# Patient Record
Sex: FEMALE | Race: BLACK OR AFRICAN AMERICAN | NOT HISPANIC OR LATINO | Employment: FULL TIME | URBAN - METROPOLITAN AREA
[De-identification: names, ages, dates, MRNs, and addresses within clinical notes are randomized per-mention and may not be internally consistent; named-entity substitution may affect disease eponyms.]

---

## 2017-07-30 ENCOUNTER — HOSPITAL ENCOUNTER (EMERGENCY)
Facility: HOSPITAL | Age: 52
Discharge: HOME | End: 2017-07-30
Attending: EMERGENCY MEDICINE
Payer: COMMERCIAL

## 2017-07-30 VITALS
TEMPERATURE: 98 F | RESPIRATION RATE: 18 BRPM | HEIGHT: 59 IN | BODY MASS INDEX: 38.1 KG/M2 | WEIGHT: 189 LBS | SYSTOLIC BLOOD PRESSURE: 143 MMHG | HEART RATE: 106 BPM | OXYGEN SATURATION: 99 % | DIASTOLIC BLOOD PRESSURE: 83 MMHG
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
## 2017-07-30 DIAGNOSIS — G44.209 TENSION HEADACHE: ICD-10-CM

## 2017-07-30 DIAGNOSIS — Z04.1 ENCOUNTER FOR EXAMINATION FOLLOWING MOTOR VEHICLE ACCIDENT (MVA): ICD-10-CM

## 2017-07-30 PROCEDURE — 99283 EMERGENCY DEPT VISIT LOW MDM: CPT

## 2017-07-30 RX ORDER — CYCLOBENZAPRINE HCL 10 MG
5 TABLET ORAL 3 TIMES DAILY PRN
Qty: 15 TABLET | Refills: 0 | Status: SHIPPED | OUTPATIENT
Start: 2017-07-30 | End: 2017-08-04

## 2017-07-30 RX ORDER — ETODOLAC 400 MG/1
400 TABLET, FILM COATED ORAL EVERY 12 HOURS PRN
Qty: 20 TABLET | Refills: 0 | Status: SHIPPED | OUTPATIENT
Start: 2017-07-30 | End: 2018-08-07
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
## 2018-08-07 PROBLEM — Z12.11 ENCOUNTER FOR SCREENING COLONOSCOPY: Status: ACTIVE | Noted: 2018-08-07

## 2018-08-07 PROBLEM — E11.42 TYPE 2 DIABETES MELLITUS WITH DIABETIC POLYNEUROPATHY, WITH LONG-TERM CURRENT USE OF INSULIN: Status: ACTIVE | Noted: 2018-08-07

## 2018-08-07 PROBLEM — E11.3393 MODERATE NONPROLIFERATIVE DIABETIC RETINOPATHY OF BOTH EYES WITHOUT MACULAR EDEMA ASSOCIATED WITH TYPE 2 DIABETES MELLITUS: Status: ACTIVE | Noted: 2018-08-07

## 2018-08-07 PROBLEM — Z79.4 TYPE 2 DIABETES MELLITUS WITH DIABETIC POLYNEUROPATHY, WITH LONG-TERM CURRENT USE OF INSULIN: Status: ACTIVE | Noted: 2018-08-07

## 2018-08-07 PROBLEM — Z00.00 ROUTINE GENERAL MEDICAL EXAMINATION AT A HEALTH CARE FACILITY: Status: ACTIVE | Noted: 2018-08-07

## 2018-08-07 PROBLEM — Z11.3 SCREENING EXAMINATION FOR VENEREAL DISEASE: Status: ACTIVE | Noted: 2018-08-07

## 2018-08-07 PROBLEM — E11.42 DIABETIC POLYNEUROPATHY ASSOCIATED WITH TYPE 2 DIABETES MELLITUS: Status: ACTIVE | Noted: 2018-08-07

## 2018-08-07 PROBLEM — E55.9 VITAMIN D DEFICIENCY: Status: ACTIVE | Noted: 2018-08-07

## 2018-08-22 PROBLEM — R80.9 PROTEINURIA: Status: ACTIVE | Noted: 2018-08-22

## 2018-09-06 ENCOUNTER — CLINICAL SUPPORT (OUTPATIENT)
Dept: DIABETES | Facility: CLINIC | Age: 53
End: 2018-09-06
Payer: COMMERCIAL

## 2018-09-06 VITALS — WEIGHT: 197.63 LBS | BODY MASS INDEX: 41.48 KG/M2 | HEIGHT: 58 IN

## 2018-09-06 DIAGNOSIS — Z79.4 TYPE 2 DIABETES MELLITUS WITH DIABETIC POLYNEUROPATHY, WITH LONG-TERM CURRENT USE OF INSULIN: Primary | ICD-10-CM

## 2018-09-06 DIAGNOSIS — E11.42 TYPE 2 DIABETES MELLITUS WITH DIABETIC POLYNEUROPATHY, WITH LONG-TERM CURRENT USE OF INSULIN: Primary | ICD-10-CM

## 2018-09-06 PROCEDURE — 99999 PR PBB SHADOW E&M-EST. PATIENT-LVL III: CPT | Mod: PBBFAC,,, | Performed by: DIETITIAN, REGISTERED

## 2018-09-06 PROCEDURE — G0108 DIAB MANAGE TRN  PER INDIV: HCPCS | Mod: S$GLB,,, | Performed by: DIETITIAN, REGISTERED

## 2018-09-06 NOTE — PROGRESS NOTES
Diabetes Education  Author: Anusha Broussard RD  Date: 9/6/2018    Diabetes Education Visit  Diabetes Education Record Assessment/Progress: Initial    Diabetes Type  Diabetes Type : Type II    Diabetes History  Diabetes Diagnosis: >10 years(dx 2001)    Referring Provider: Voila Garcia MD  52 y.o. female in clinic today for diabetes education. Patient has taken diabetes education courses in the past. Pt said she did well with diet when first diagnosed and has done less as time went on.   Dr. Garcia has prescribed Freestyle Delaney, but no approval by insurance yet.     DM MEDICATIONS:    Humalog 75-25, 60 units am, 70 units pm  Bydureon, 2mg weekly - has not been approved by insurance yet so not yet started       No results found for: HGBA1C    Nutrition  Meal Planning: skipping meals(usually eats twice daily)  What type of sweetener do you use?: sugar  What type of beverages do you drink?: regular soda/tea, water, juice  Meal Plan 24 Hour Recall - Breakfast: coffee w/sugar and creamer  Meal Plan 24 Hour Recall - Lunch: 12p - fried chicken leg and thigh, french fries, Sprite  Meal Plan 24 Hour Recall - Dinner: 8p - rice, navy beans, pinapple Crush   Meal Plan 24 Hour Recall - Snack: none    Monitoring   Self Monitoring : last checked 1 wk ago - fasting was in 400's   Blood Glucose Logs: No  Do you use a personal glucose monitor?: No  In the last month, how often have you had a low blood sugar reaction?: never  Can you tell when your blood sugar is too high?: yes(dry mouth, sluggish, frequent urination)  How do you treat high blood sugar?: drink water, take medicine    Exercise   Exercise Type: none(having neuropathy in legs and feet which prevents exercise)    Current Diabetes Treatment   Current Treatment: Diet, Insulin    Social History  Preferred Learning Method: Face to Face  Primary Support: Self  Educational Level: Some College(associate degree in accounting)  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  Smoking  Status: Never a Smoker  Alcohol Use: Rarely      DDS-2 Score  ( > 3 = SIGNIFICANT DISTRESS): 3.5  DDS Score  ( > 3 = SIGNIFICANT DISTRESS): 2.59  Emotional [place] Score: 3  Physician-Related Distress: 1  Regimen-Related Distress: 4  Interpersonal Distress: 1.67    Barriers to Change  Learning Challenges : Vision  Vision - further explanation: visually impaired(diabetic retinopathy, 2011 - lost sight in left eye, 2012 retina reattached and got vision back )    Readiness to Learn   Readiness to Learn : Acceptance    Cultural Influences  Cultural Influences: No    Diabetes Education Assessment/Progress  Diabetes Disease Process (diabetes disease process and treatment options): Discussion, Individual Session, Demonstrates Understanding/Competency(verbalizes/demonstrates)  Nutrition (Incorporating nutritional management into one's lifestyle): Discussion, Individual Session, Demonstrates Understanding/Competency (verbalizes/demonstrates), Written Materials Provided, Instructed  Physical Activity (incorporating physical activity into one's lifestyle): Discussion, Individual Session, Demonstrates Understanding/Competency (verbalizes/demonstrates)  Medications (states correct name, dose, onset, peak, duration, side effects & timing of meds): Discussion, Individual Session, Demonstrates Understanding/Competency(verbalizes/demonstrates)  Monitoring (monitoring blood glucose/other parameters & using results): Discussion, Individual Session, Instructed, Demonstrates Understanding/Competency (verbalizes/demonstrates), Written Materials Provided  Acute Complications (preventing, detecting, and treating acute complications): Discussion, Instructed, Individual Session, Demonstrates Understanding/Competency (verbalizes/demonstrates), Written Materials Provided  Chronic Complications (preventing, detecting, and treating chronic complications): Discussion, Individual Session, Demonstrates Understanding/Competency  (verbalizes/demonstrates)  Clinical (diabetes, other pertinent medical history, and relevant comorbidities reviewed during visit): Discussion, Individual Session, Demonstrates Understanding/Competency (verbalizes/demonstrates)  Cognitive (knowledge of self-management skills, functional health literacy): Discussion, Individual Session, Demonstrates Understanding/Competency (verbalizes/demonstrates)  Psychosocial (emotional response to diabetes): Discussion, Individual Session, Demonstrates Understanding/Competency (verbalizes/demonstrates)  Diabetes Distress and Support Systems: Other, Individual Session  Behavioral (readiness for change, lifestyle practices, self-care behaviors): Discussion, Individual Session, Demonstrates Understanding/Competency (verbalizes/demonstrates)    Goals  Patient has selected/evaluated goals during today's session: Yes, selected  Healthy Eating: Set(Avoid sugary beverages; use Splenda in coffee, drink Diet soft drinks; follow meal plan)  Start Date: 09/06/18  Target Date: 12/06/18  Physical Activity: Set(start using stationary bike at home, 3 days per wk )  Start Date: 09/06/18  Target Date: 12/06/18  Monitoring: Set(check bg 2-3 times daily - at least fasting and ac dinner )  Start Date: 09/06/18  Target Date: 12/06/18       Diabetes Care Plan/Intervention  Education Plan/Intervention: Individual Follow-Up DSMT, Other(Requested labs from Dr. Garcia - pt stated that recent a1c was done)   F/u in 6 wks    Diabetes Meal Plan  Calories: 1400, 1200  Carbohydrate Per Meal: 30-45g  Carbohydrate Per Snack : 15-20g    Education Units of Time   Time Spent: 60 min    Health Maintenance was reviewed today with patient. Discussed with patient importance of routine eye exams, foot exams/foot care, blood work (i.e.: A1c, microalbumin, and lipid), dental visits, yearly flu vaccine, and pneumonia vaccine as indicated by PCP. Patient verbalized understanding.     Health Maintenance Topics with due  status: Not Due       Topic Last Completion Date    Eye Exam 12/01/2017    TETANUS VACCINE 08/07/2018    Foot Exam 08/07/2018    Lipid Panel 08/07/2018    Hemoglobin A1c 08/07/2018    Low Dose Statin 09/06/2018     Health Maintenance Due   Topic Date Due    Pneumococcal PPSV23 (Medium Risk) (1) 10/23/1985    Pap Smear with HPV Cotest  10/23/1988    Mammogram  10/23/2007    Colonoscopy  10/23/2017

## 2018-09-06 NOTE — LETTER
September 6, 2018        Viola Garcia MD  [address]  [facility] [address]             [facility]  [address] [place] [address]  Phone: [phone]  Fax: [phone]   Patient: Jeanne Johnson   MR Number: [id number]   YOB: 1965   Date of Visit: 9/6/2018       Dear Dr. Garcia:    Thank you for referring Jeanne Johnson to me for evaluation. Below are the relevant portions of my assessment and plan of care.      If you have questions, please do not hesitate to call me. I look forward to following Jeanne along with you.    Sincerely,      Anusha Broussard RD           CC  No Recipients

## 2018-09-06 NOTE — PATIENT INSTRUCTIONS
- Avoid beverages with added sugar. Use Splenda in coffee. Drink diet sodas.      - Use stationary bike or treadmill 3 days/week    - Check blood sugar at least twice daily    Fasting blood sugar should be <130  After meal blood sugar should be <180

## 2018-09-10 ENCOUNTER — TELEPHONE (OUTPATIENT)
Dept: PODIATRY | Facility: CLINIC | Age: 53
End: 2018-09-10

## 2018-09-10 NOTE — TELEPHONE ENCOUNTER
Ms. DROIAN Johnson was given a return call, she rescheduled with Dr. Jenkins on 9/26/2018 for 9:40am. Patient verbalized understanding and the call ended well.     FYI: Patient had an appointment 9/6/2018 but due to her referral not being approved she was unable to be seen. She wanted to know if she would be charged for missing her appointment and how does she know if her referral has been approved. She was informed to call the clinic after her and I call ends and ask these questions and she receive an answer or be transferred to the appropriate department to have her questions answered.  Patient verbalized understanding and the call ended well.

## 2018-09-10 NOTE — TELEPHONE ENCOUNTER
----- Message from Miley Rae LPN sent at 9/7/2018  2:54 PM CDT -----  Contact: self      ----- Message -----  From: Chuck Syed  Sent: 9/7/2018   2:12 PM  To: Gregory Marlow Staff    Pt called about the status of her yesterday appt she receive text saying appt was cancel pt stated she was the just couldn't be seen by Doctor.

## 2018-09-26 ENCOUNTER — OFFICE VISIT (OUTPATIENT)
Dept: PODIATRY | Facility: CLINIC | Age: 53
End: 2018-09-26
Payer: COMMERCIAL

## 2018-09-26 VITALS
SYSTOLIC BLOOD PRESSURE: 120 MMHG | HEIGHT: 58 IN | RESPIRATION RATE: 20 BRPM | DIASTOLIC BLOOD PRESSURE: 74 MMHG | WEIGHT: 197.75 LBS | HEART RATE: 95 BPM | BODY MASS INDEX: 41.51 KG/M2

## 2018-09-26 DIAGNOSIS — M21.41 PES PLANUS OF BOTH FEET: ICD-10-CM

## 2018-09-26 DIAGNOSIS — Z79.4 TYPE 2 DIABETES MELLITUS WITH DIABETIC POLYNEUROPATHY, WITH LONG-TERM CURRENT USE OF INSULIN: Primary | ICD-10-CM

## 2018-09-26 DIAGNOSIS — M21.42 PES PLANUS OF BOTH FEET: ICD-10-CM

## 2018-09-26 DIAGNOSIS — E11.42 TYPE 2 DIABETES MELLITUS WITH DIABETIC POLYNEUROPATHY, WITH LONG-TERM CURRENT USE OF INSULIN: Primary | ICD-10-CM

## 2018-09-26 PROCEDURE — 99999 PR PBB SHADOW E&M-EST. PATIENT-LVL III: CPT | Mod: PBBFAC,,, | Performed by: PODIATRIST

## 2018-09-26 PROCEDURE — 99204 OFFICE O/P NEW MOD 45 MIN: CPT | Mod: S$GLB,,, | Performed by: PODIATRIST

## 2018-09-26 PROCEDURE — 3008F BODY MASS INDEX DOCD: CPT | Mod: CPTII,S$GLB,, | Performed by: PODIATRIST

## 2018-09-26 NOTE — PATIENT INSTRUCTIONS
[facility]    [address] [place] [address]  Phone: [phone] [address] [place] [address]   Phone: [phone] [address] [place] [address]   Phone: [phone] [address] [facility] [place] [address]   Phone: [phone]    [address] [place] [address]   Phone: [phone] [address] [facility] [address] [place] [address], [place] [address]   Phone: [phone]

## 2018-09-26 NOTE — LETTER
September 28, 2018      Viola Garcia MD  [address]  [place] [address]           [facility]  [address]  [place] [address]  Phone: [phone]  Fax: [phone]          Patient: Jeanne Johnson   MR Number: [id number]   YOB: 1965   Date of Visit: 9/26/2018       Dear Dr. Viola Garcia:    Thank you for referring Jeanne Johnson to me for evaluation. Attached you will find relevant portions of my assessment and plan of care.    If you have questions, please do not hesitate to call me. I look forward to following Jeanne Johnson along with you.    Sincerely,    Kashmir Jenkins DPM    Enclosure  CC:  No Recipients    If you would like to receive this communication electronically, please contact [email or web address] or [phone] to request more information on [employer] access.    For providers and/or their staff who would like to refer a patient to [facility], please contact us through our one-stop-shop provider referral line, [facility], at [phone].    If you feel you have received this communication in error or would no longer like to receive these types of communications, please e-mail [email or web address]

## 2018-09-29 NOTE — PROGRESS NOTES
Subjective:     Patient ID: Jeanne Johnson is a 52 y.o. female.    Chief Complaint: Diabetic Foot Exam (last pcp visit was on 8/22/2018 with Dr. Garcia)    Jeanne is a 52 y.o. female who presents to the clinic upon referral from Dr. Garcia  for evaluation and treatment of diabetic feet. Jeanne has a past medical history of Atrial fibrillation, Cataract, CHF (congestive heart failure), Diabetes mellitus, Diabetes mellitus, type 2, Diabetic retinopathy, High cholesterol, Hypertension, Hyperthyroidism, SVT (supraventricular tachycardia), Tachycardia, and Tachycardia. Patient relates no major problem with feet. Only complaints today consist of arch pain. Rates pain 2/10 when present.     PCP: Viola Garcia MD    Date Last Seen by PCP: 8/22/18    Current shoe gear: Casual shoes      Patient Active Problem List   Diagnosis    Cataract associated with type 2 diabetes mellitus    HLD (hyperlipidemia)    Routine general medical examination at a health care facility    Vitamin D deficiency    Moderate nonproliferative diabetic retinopathy of both eyes without macular edema associated with type 2 diabetes mellitus    Diabetic polyneuropathy associated with type 2 diabetes mellitus    Type 2 diabetes mellitus with diabetic polyneuropathy, with long-term current use of insulin    Screening examination for venereal disease    Encounter for screening colonoscopy    Proteinuria          Medication List           Accurate as of 9/26/18 11:59 PM. If you have any questions, ask your nurse or doctor.               CHANGE how you take these medications    AFLURIA QUAD 9058-1505 (PF) 60 mcg/0.5 mL vaccine  Generic drug:  influenza  ADMINISTER 0.5ML IN THE MUSCLE AS DIRECTED  What changed:  See the new instructions.  Changed by:  Viola Garcia MD     exenatide microspheres 2 mg Ssrr  Commonly known as:  BYDUREON  Inject 1 tablet into the skin once daily.  What changed:  when to take this        CONTINUE taking  these medications    aspirin 81 MG EC tablet  Commonly known as:  ECOTRIN     atorvastatin 40 MG tablet  Commonly known as:  LIPITOR     clopidogrel 75 mg tablet  Commonly known as:  PLAVIX     cyanocobalamin 500 MCG tablet     ezetimibe 10 mg tablet  Commonly known as:  ZETIA  Take 1 tablet (10 mg total) by mouth once daily.     flash glucose scanning reader Misc  Commonly known as:  FREESTYLE NALDO READER  1 Device by Misc.(Non-Drug; Combo Route) route 4 (four) times daily.     flash glucose sensor Kit  Commonly known as:  FREESTYLE NALDO SENSOR  1 kit by Misc.(Non-Drug; Combo Route) route 4 (four) times daily.     HumaLOG Mix 75-25 KwikPen 100 unit/mL (75-25) Inpn  Generic drug:  insulin lispro protamin-lispro  INJ 60 units in the am and 70  UNITS SC BID     metoprolol succinate 100 MG 24 hr tablet  Commonly known as:  TOPROL-XL     potassium gluconate 550 mg (90 mg) Tab     quinapril-hydrochlorothiazide 20-25 mg per tablet  Commonly known as:  ACCURETIC     VITAMIN D ORAL           Where to Get Your Medications      These medications were sent to [facility] [address] [address] [place] [facility] [address] [facility] [place] [address] [address] [facility] [address]    Phone:  [phone]   · RADHAURIA QUAD [id number] (PF) 60 mcg/0.5 mL vaccine         Review of patient's allergies indicates:   Allergen Reactions    Adhesive      Other reaction(s): Rash       Past Surgical History:   Procedure Laterality Date    abscess removal      BREAST SURGERY      biopsy     CARPAL TUNNEL RELEASE      CATARACT EXTRACTION, BILATERAL       SECTION      EYE SURGERY         Family History   Problem Relation Age of Onset    Diabetes Mother     Hypertension Mother     Diabetes Father     Heart disease Father     Hypertension Father     Cancer Sister     Diabetes Sister     Diabetes Maternal Grandmother     Diabetes Paternal Grandmother     Stroke Neg Hx        Social History  "    Socioeconomic History    Marital status: Single     Spouse name: Not on file    Number of children: Not on file    Years of education: Not on file    Highest education level: Not on file   Social Needs    Financial resource strain: Not on file    Food insecurity - worry: Not on file    Food insecurity - inability: Not on file    Transportation needs - medical: Not on file    Transportation needs - non-medical: Not on file   Occupational History    Not on file   Tobacco Use    Smoking status: Never Smoker    Smokeless tobacco: Never Used   Substance and Sexual Activity    Alcohol use: No    Drug use: No    Sexual activity: Yes   Other Topics Concern    Not on file   Social History Narrative    Not on file       Vitals:    09/26/18 1001   BP: 120/74   Pulse: 95   Resp: 20   Weight: 89.7 kg (197 lb 12 oz)   Height: 4' 10" (1.473 m)   PainSc:   2   PainLoc: Foot       Review of Systems   Constitutional: Negative for chills and fever.   Respiratory: Negative for shortness of breath.    Cardiovascular: Negative for chest pain, palpitations, orthopnea, claudication and leg swelling.   Gastrointestinal: Negative for diarrhea, nausea and vomiting.   Musculoskeletal: Negative for joint pain.   Skin: Negative for rash.   Neurological: Negative for dizziness, tingling, sensory change, focal weakness and weakness.   Psychiatric/Behavioral: Negative.              Objective:   PHYSICAL EXAM: Apperance: Alert and orient in no distress,well developed, and with good attention to grooming and body habits  Patient presents ambulating in casual shoes.   LOWER EXTREMITY EXAM:  VASCULAR: Dorsalis pedis pulses 2/4 bilateral and Posterior Tibial pulses 2/4 bilateral. Capillary fill time <4 seconds bilateral. No edema observed bilateral. Varicosities absent bilateral. Skin temperature of the lower extremities is warm to warm, proximal to distal. Hair growth WNL bilateral.  DERMATOLOGICAL: No skin rashes, subcutaneous " nodules, lesions, or ulcers observed bilateral. Nails 1,2,3,4,5 bilateral normal length and thickness. Webspaces 1,2,3,4 bilateral clean, dry and without evidence of break in skin integrity.   NEUROLOGICAL: Light touch, sharp-dull, proprioception all present and equal bilaterally.  Vibratory sensation intact at bilateral hallux. Protective sensation intact at all 20 sites as tested with a [place]-Cynthia 5.07 monofilament.   MUSCULOSKELETAL: Muscle strength is 5/5 for foot inverters, everters, plantarflexors, and dorsiflexors. Muscle tone is normal. Pes planus noted bilateral.       Assessment:   Type 2 diabetes mellitus with diabetic polyneuropathy, with long-term current use of insulin    Pes planus of both feet          Plan:   Type 2 diabetes mellitus with diabetic polyneuropathy, with long-term current use of insulin    Pes planus of both feet      I counseled the patient on her conditions, regarding findings of my examination, my impressions, and usual treatment plan.   Greater than 50% of this visit spent on counseling and coordination of care.  Greater than 15 minutes of a 20 minute appointment spent on education about the diabetic foot, neuropathy, and prevention of limb loss.  Shoe inspection. Diabetic Foot Education. Patient reminded of the importance of good nutrition and blood sugar control to help prevent podiatric complications of diabetes. Patient instructed on proper foot hygeine. We discussed wearing proper shoe gear, daily foot inspections, never walking without protective shoe gear, never putting sharp instruments to feet.    The patient and I reviewed the types of shoes she should be wearing, my recommendation includes generally the best time of the day for a shoe fitting is the afternoon, shoes with a wide toe box, very good cushion, and tennis shoes with removable inner soles.The patient and I reviewed my recommendations for over-the-counter orthotic inserts.   Patient  will continue to  monitor the areas daily, inspect feet, wear protective shoe gear when ambulatory, moisturizer to maintain skin integrity. Patient reminded of the importance of good nutrition and blood sugar control to help prevent podiatric complications of diabetes.  Patient to return 12 months or sooner if needed.                 Kashmir Jenkins DPM  [facility]

## 2018-11-12 PROBLEM — Z00.00 ROUTINE GENERAL MEDICAL EXAMINATION AT A HEALTH CARE FACILITY: Status: RESOLVED | Noted: 2018-08-07 | Resolved: 2018-11-12

## 2019-02-13 PROBLEM — S39.012A STRAIN OF LUMBAR REGION: Status: ACTIVE | Noted: 2019-02-13

## 2019-02-13 PROBLEM — S16.1XXA CERVICAL STRAIN: Status: ACTIVE | Noted: 2019-02-13

## 2020-02-13 PROBLEM — Z11.3 SCREENING EXAMINATION FOR VENEREAL DISEASE: Status: RESOLVED | Noted: 2018-08-07 | Resolved: 2020-02-13

## 2020-02-13 PROBLEM — S16.1XXA CERVICAL STRAIN: Status: RESOLVED | Noted: 2019-02-13 | Resolved: 2020-02-13

## 2020-02-13 PROBLEM — S39.012A STRAIN OF LUMBAR REGION: Status: RESOLVED | Noted: 2019-02-13 | Resolved: 2020-02-13

## 2020-02-13 PROBLEM — Z12.11 ENCOUNTER FOR SCREENING COLONOSCOPY: Status: RESOLVED | Noted: 2018-08-07 | Resolved: 2020-02-13

## 2020-02-13 PROBLEM — I73.9 PAD (PERIPHERAL ARTERY DISEASE): Status: ACTIVE | Noted: 2020-02-13

## 2020-02-13 PROBLEM — Z00.00 ANNUAL PHYSICAL EXAM: Status: ACTIVE | Noted: 2018-08-07

## 2020-03-15 PROBLEM — I48.0 PAROXYSMAL ATRIAL FIBRILLATION: Status: ACTIVE | Noted: 2020-03-15

## 2020-03-15 PROBLEM — R55 SYNCOPE: Status: ACTIVE | Noted: 2020-03-15

## 2020-05-18 PROBLEM — Z00.00 ANNUAL PHYSICAL EXAM: Status: RESOLVED | Noted: 2018-08-07 | Resolved: 2020-05-18

## 2024-08-15 ENCOUNTER — OFFICE VISIT (OUTPATIENT)
Dept: OPHTHALMOLOGY | Facility: CLINIC | Age: 59
End: 2024-08-15
Payer: COMMERCIAL

## 2024-08-15 DIAGNOSIS — H20.9 IRITIS OF RIGHT EYE: ICD-10-CM

## 2024-08-15 DIAGNOSIS — E11.3593 PROLIFERATIVE DIABETIC RETINOPATHY OF BOTH EYES ASSOCIATED WITH TYPE 2 DIABETES MELLITUS, MACULAR EDEMA PRESENCE UNSPECIFIED: Primary | ICD-10-CM

## 2024-08-15 DIAGNOSIS — Z96.1 PSEUDOPHAKIA OF BOTH EYES: ICD-10-CM

## 2024-08-15 DIAGNOSIS — H40.51X3 SECONDARY GLAUCOMA OF RIGHT EYE, SEVERE STAGE: ICD-10-CM

## 2024-08-15 PROCEDURE — 99999 PR PBB SHADOW E&M-NEW PATIENT-LVL II: CPT | Mod: PBBFAC,,, | Performed by: OPHTHALMOLOGY

## 2024-08-15 NOTE — PROGRESS NOTES
HPI    Pt last exam was 2023 or 2024 with [facility]  where she received   injections - she was to return back to them in February   H/o retina injections OS - some pain in eyes and tearing - pt has drops at   home- using tears- had Pred and not using it daily - was on blue cap drops   in the past - not using lately   Diabetic x many years   BS was HIGH- likely over 300's, pt is taking Insulin- pt states that her   Blood pressure was very elevated for the past 3 months       Past Med/Surg History: has a past surgical history that includes Eye   surgery; Cataract Removal Left Eye;  section; Breast biopsy;   Retinal Retachment Left Eye (Left); Carpal Tunnel Surgery Right Hand;   Abscess drainage (Bilateral); Cataract extraction (Right); Retinal laser   procedure (Right); and Pars plana vitrectomy (Right, 10/14/2020). has a   past medical history of Cancer (HCC), CHF (congestive heart failure)   (HCC), Diabetes mellitus (HCC), Diabetic retinopathy (HCC), GERD   (gastroesophageal reflux disease), Hyperlipidemia, Hypertension,   Hypothyroidism, Neuropathy, Shortness of breath on exertion, SVT   (supraventricular tachycardia), and Vision abnormalities.      Last edited by Aimee Santos MD on 8/15/2024  1:30 PM.            Assessment /Plan     For exam results, see Encounter Report.    Proliferative diabetic retinopathy of both eyes associated with type 2 diabetes mellitus, macular edema presence unspecified    Secondary glaucoma of right eye, severe stage    Iritis of right eye    Pseudophakia of both eyes      RTC ASAP with [facility] as patient requires regular intravitreal injections OS. Was last seen 2023 and had f/u scheduled in 1 month but was unable to make appt.  Resume Pred BID OD   Resume cosopt BID OU

## 2024-11-13 ENCOUNTER — TELEPHONE (OUTPATIENT)
Dept: OPHTHALMOLOGY | Facility: CLINIC | Age: 59
End: 2024-11-13
Payer: MEDICAID

## 2024-11-13 NOTE — TELEPHONE ENCOUNTER
----- Message from Josi sent at 11/13/2024  4:03 PM CST -----  Contact: Jeanne  Mrs. Angel needs a call back at [phone] in regards to a missed call she received on today.    Thanks  MW

## 2024-11-13 NOTE — TELEPHONE ENCOUNTER
Called pt to ask if she had any questions about her previous visit and or anything we could do for her. Pt replied that the DMV had forums for Dr. Santos to fill regarding her last visit. I told pt that Dr. Santos could fill them out tomorrow and told pt what time Dr. Santos would be in [facility] 8am-3pm. Pt understood that Dr. Santos was at [facility] and her availability. Pt said she would stop by for Dr. Santos to look and sign her paperwork. Pt had no more questions for staff.